# Patient Record
Sex: FEMALE | Race: WHITE | NOT HISPANIC OR LATINO | Employment: OTHER | URBAN - METROPOLITAN AREA
[De-identification: names, ages, dates, MRNs, and addresses within clinical notes are randomized per-mention and may not be internally consistent; named-entity substitution may affect disease eponyms.]

---

## 2017-06-28 NOTE — PATIENT DISCUSSION
Discussed the fact that corneal ulcers can not only be vision threatening but can be eyeball threatening.

## 2017-07-28 NOTE — PATIENT DISCUSSION
Dispensed Rx for temporary glasses since patient unable to wear contacts.  Understands the Rx may not be exact but will get him through until eyes heal.

## 2017-07-31 NOTE — PATIENT DISCUSSION
Pt. States Rx of  OD -3.50 and OS -2.50 was too strong and caused too much glare with light.  Asked that we lessen strength to make more tolerable understanding distance vision may not be as sharp.  Trial loose lenses OD -2.50 and OS -2.00 which patient appreciated.

## 2017-08-07 NOTE — PROCEDURE NOTE: CLINICAL
PROCEDURE NOTE: Punctal Plugs, Silicone RLL #1 OD. Diagnosis: BYRON. Anesthesia: Topical. Prior to treatment, the risks/benefits/alternatives were discussed. The patient wished to proceed with procedure. Permanent silicone plugs were inserted. Size/location of plugs inserted: 0.8mm RLL. Patient tolerated procedure well. There were no complications. Post procedure instructions given. Lorna Rivas

## 2017-08-21 NOTE — PATIENT DISCUSSION
Order work-up including blood for ACE, JENARO, RF, CBC with Diff, ESR, CR-P (quantitative), toxoplasma titers, RPR.

## 2020-06-02 ENCOUNTER — NEW PATIENT COMPREHENSIVE (OUTPATIENT)
Dept: URBAN - METROPOLITAN AREA CLINIC 43 | Facility: CLINIC | Age: 55
End: 2020-06-02

## 2020-06-02 DIAGNOSIS — H52.03: ICD-10-CM

## 2020-06-02 DIAGNOSIS — H25.093: ICD-10-CM

## 2020-06-02 DIAGNOSIS — H35.30: ICD-10-CM

## 2020-06-02 PROCEDURE — 92015 DETERMINE REFRACTIVE STATE: CPT

## 2020-06-02 PROCEDURE — 92004 COMPRE OPH EXAM NEW PT 1/>: CPT

## 2020-06-02 ASSESSMENT — VISUAL ACUITY
OD_CC: J10
OS_SC: J12
OD_SC: <J12
OD_CC: 20/40-1
OD_PH: 20/50
OS_PH: 20/30
OS_CC: 20/30-2
OD_SC: 20/70+2
OS_SC: 20/30-1
OS_CC: J10

## 2020-06-02 ASSESSMENT — TONOMETRY
OD_IOP_MMHG: 18
OS_IOP_MMHG: 18

## 2020-10-22 NOTE — PATIENT DISCUSSION
Patient advised of the right to post-operative care by the surgeon. Patient is fully informed of, and agreed to, co-management with their primary optometric physician. Post-operative care by the surgeon is not medically necessary and co-management is clinically appropriate. Patient has received itemization of fees related to cataract surgery. Transfer of care letter completed for the patient. Transfer care of left eye to Dr. Rivera on 10.23.2020. Patient instructed to call immediately if any new distortion, blurring, decreased vision or eye pain.

## 2020-10-28 NOTE — PATIENT DISCUSSION
Patient advised of the right to post-operative care by the surgeon. Patient is fully informed of, and agreed to, co-management with their primary optometric physician. Post-operative care by the surgeon is not medically necessary and co-management is clinically appropriate. Patient has received itemization of fees related to cataract surgery. Transfer of care letter completed for the patient. Transfer care of right eye to Dr. Rivera on 10.29.2020. Patient instructed to call immediately if any new distortion, blurring, decreased vision or eye pain.

## 2020-10-28 NOTE — PATIENT DISCUSSION
The types of intraocular lenses were reviewed with the patient along with a discussion of their various strengths and weaknesses. pt wishes to proceed with RLE (dx cataract) Basic Plus goal francisco j OD.

## 2021-09-03 ENCOUNTER — ESTABLISHED COMPREHENSIVE EXAM (OUTPATIENT)
Dept: URBAN - METROPOLITAN AREA CLINIC 43 | Facility: CLINIC | Age: 56
End: 2021-09-03

## 2021-09-03 DIAGNOSIS — H25.093: ICD-10-CM

## 2021-09-03 PROCEDURE — 92014 COMPRE OPH EXAM EST PT 1/>: CPT

## 2021-09-03 PROCEDURE — 92015 DETERMINE REFRACTIVE STATE: CPT

## 2021-09-03 ASSESSMENT — VISUAL ACUITY
OD_CC: J2
OS_CC: 20/30
OS_CC: J3-
OD_CC: 20/40
OS_SC: 20/50
OS_SC: J14
OD_SC: 20/200
OD_SC: J14

## 2021-09-03 ASSESSMENT — TONOMETRY
OD_IOP_MMHG: 17
OS_IOP_MMHG: 18

## 2021-09-07 ENCOUNTER — CATARACT CONSULT (OUTPATIENT)
Dept: URBAN - METROPOLITAN AREA CLINIC 43 | Facility: CLINIC | Age: 56
End: 2021-09-07

## 2021-09-07 DIAGNOSIS — H25.811: ICD-10-CM

## 2021-09-07 DIAGNOSIS — H25.812: ICD-10-CM

## 2021-09-07 DIAGNOSIS — Z98.890: ICD-10-CM

## 2021-09-07 DIAGNOSIS — H35.30: ICD-10-CM

## 2021-09-07 PROCEDURE — 92136TC50 INTERFEROMETRY - TECHNICAL COMPONENT

## 2021-09-07 PROCEDURE — 92025-2 CORNEAL TOPOGRAPHY, PT

## 2021-09-07 PROCEDURE — 99214 OFFICE O/P EST MOD 30 MIN: CPT

## 2021-09-07 PROCEDURE — V2799PMN IMPRIMIS PRED-MOXI-NEPAF 5ML

## 2021-09-07 PROCEDURE — 92134 CPTRZ OPH DX IMG PST SGM RTA: CPT

## 2021-09-07 ASSESSMENT — VISUAL ACUITY
OD_CC: 20/30-2
OD_RAM: 20/25
OS_CC: J2
OS_AM: 20/20
OS_SC: J12
OS_SC: 20/60
OS_BAT: 20/70
OD_BAT: <20/400
OD_SC: <J12
OD_CC: J3
OD_SC: 20/100
OS_CC: 20/30

## 2021-09-07 ASSESSMENT — TONOMETRY
OS_IOP_MMHG: 12
OD_IOP_MMHG: 12

## 2021-10-06 ENCOUNTER — PRE-OP/H&P (OUTPATIENT)
Dept: URBAN - METROPOLITAN AREA CLINIC 39 | Facility: CLINIC | Age: 56
End: 2021-10-06

## 2021-10-06 ENCOUNTER — SURGERY/PROCEDURE (OUTPATIENT)
Dept: URBAN - METROPOLITAN AREA CLINIC 43 | Facility: CLINIC | Age: 56
End: 2021-10-06

## 2021-10-06 DIAGNOSIS — H35.30: ICD-10-CM

## 2021-10-06 DIAGNOSIS — H25.812: ICD-10-CM

## 2021-10-06 DIAGNOSIS — H25.811: ICD-10-CM

## 2021-10-06 DIAGNOSIS — Z98.890: ICD-10-CM

## 2021-10-06 PROCEDURE — 66984CV REMOVE CATARACT, INSERT LENS, CUSTOM VISION

## 2021-10-06 PROCEDURE — 66999LNSR LENSAR LASER FOR CAT SX

## 2021-10-06 PROCEDURE — 99211T TECH SERVICE

## 2021-10-06 PROCEDURE — 65772LRI LRI DURING CAT SX

## 2021-10-07 ENCOUNTER — POST OP/EVAL OF SECOND EYE (OUTPATIENT)
Dept: URBAN - METROPOLITAN AREA CLINIC 39 | Facility: CLINIC | Age: 56
End: 2021-10-07

## 2021-10-07 DIAGNOSIS — Z96.1: ICD-10-CM

## 2021-10-07 DIAGNOSIS — H25.812: ICD-10-CM

## 2021-10-07 PROCEDURE — 99024 POSTOP FOLLOW-UP VISIT: CPT

## 2021-10-07 PROCEDURE — 99213 OFFICE O/P EST LOW 20 MIN: CPT

## 2021-10-07 ASSESSMENT — TONOMETRY
OD_IOP_MMHG: 13
OS_IOP_MMHG: 13

## 2021-10-07 ASSESSMENT — VISUAL ACUITY
OD_SC: 20/30+1
OS_SC: 20/30-1

## 2021-10-13 ENCOUNTER — SURGERY/PROCEDURE (OUTPATIENT)
Dept: URBAN - METROPOLITAN AREA CLINIC 43 | Facility: CLINIC | Age: 56
End: 2021-10-13

## 2021-10-13 ENCOUNTER — PRE-OP/H&P (OUTPATIENT)
Dept: URBAN - METROPOLITAN AREA CLINIC 39 | Facility: CLINIC | Age: 56
End: 2021-10-13

## 2021-10-13 DIAGNOSIS — H25.812: ICD-10-CM

## 2021-10-13 DIAGNOSIS — Z96.1: ICD-10-CM

## 2021-10-13 PROCEDURE — 66999LNSR LENSAR LASER FOR CAT SX

## 2021-10-13 PROCEDURE — 99211T TECH SERVICE

## 2021-10-13 PROCEDURE — 66984CV REMOVE CATARACT, INSERT LENS, CUSTOM VISION

## 2021-10-13 PROCEDURE — 65772LRI LRI DURING CAT SX

## 2021-10-13 ASSESSMENT — VISUAL ACUITY
OS_AM: 20/20
OS_SC: 20/60
OS_SC: J12
OD_SC: J2
OS_BAT: 20/70
OD_SC: 20/25

## 2021-10-13 ASSESSMENT — TONOMETRY
OS_IOP_MMHG: 13
OD_IOP_MMHG: 14

## 2021-10-14 ENCOUNTER — 1 DAY POST-OP (OUTPATIENT)
Dept: URBAN - METROPOLITAN AREA CLINIC 39 | Facility: CLINIC | Age: 56
End: 2021-10-14

## 2021-10-14 DIAGNOSIS — Z96.1: ICD-10-CM

## 2021-10-14 PROCEDURE — 99024 POSTOP FOLLOW-UP VISIT: CPT

## 2021-10-14 ASSESSMENT — VISUAL ACUITY
OD_SC: 20/25
OS_SC: J2
OS_PH: 20/40+2
OS_SC: 20/60-1
OD_SC: J2

## 2021-10-14 ASSESSMENT — TONOMETRY
OD_IOP_MMHG: 13
OS_IOP_MMHG: 14

## 2021-11-01 ENCOUNTER — POST-OP CATARACT (OUTPATIENT)
Dept: URBAN - METROPOLITAN AREA CLINIC 39 | Facility: CLINIC | Age: 56
End: 2021-11-01

## 2021-11-01 DIAGNOSIS — Z96.1: ICD-10-CM

## 2021-11-01 PROCEDURE — 99024 POSTOP FOLLOW-UP VISIT: CPT

## 2021-11-01 ASSESSMENT — TONOMETRY
OD_IOP_MMHG: 14
OS_IOP_MMHG: 13

## 2021-11-01 ASSESSMENT — VISUAL ACUITY
OS_SC: 20/100
OS_SC: J1
OD_SC: 20/20
OD_SC: J2

## 2022-10-14 NOTE — PATIENT DISCUSSION
Surgery Counseling: I have discussed the option of glasses versus cataract surgery versus following. It was explained that when the patient’s vision no longer meets their visual needs and a glasses prescription does not improve visual symptoms, the option of cataract surgery is a reasonable next step. It was explained that there is no guarantee that removing the cataract will improve their visual symptoms, however; it is believed that the cataract is contributing to the patient's visual impairment and surgery may improve both the visual and functional status of the patient. The risks, benefits and alternatives of surgery were discussed with the patient. After this discussion, the patient desires to proceed with cataract surgery with implantation of an intraocular lens to improve vision for OU.

## 2022-10-14 NOTE — PATIENT DISCUSSION
The patient was informed that with [address] [facility] for distance, they will need glasses for all near and intermediate activities after surgery.

## 2022-10-21 NOTE — PATIENT DISCUSSION
The patient was informed that with [address] [facility] for distance, they will need glasses for all near and intermediate activities after surgery.

## 2022-10-21 NOTE — PATIENT DISCUSSION
The patient was informed that with [address] [facility] for distance, they will need glasses for all near and intermediate activities after surgery.

## 2022-11-03 NOTE — PATIENT DISCUSSION
The patient was informed that with [address] [facility] [address] for distance, they will need glasses for all near and intermediate activities after surgery.

## 2022-11-11 NOTE — PATIENT DISCUSSION
The patient was informed that with [address] [facility] for distance, they will need glasses for all near and intermediate activities after surgery.

## 2024-08-29 ENCOUNTER — CONSULTATION/EVALUATION (OUTPATIENT)
Dept: URBAN - METROPOLITAN AREA CLINIC 44 | Facility: CLINIC | Age: 59
End: 2024-08-29

## 2024-08-29 DIAGNOSIS — H02.835: ICD-10-CM

## 2024-08-29 DIAGNOSIS — H02.832: ICD-10-CM

## 2024-08-29 PROCEDURE — 99499CC150 CONSULT, COSMETIC

## 2024-08-29 ASSESSMENT — VISUAL ACUITY
OD_SC: 20/20
OS_SC: 20/40